# Patient Record
Sex: FEMALE | Race: WHITE
[De-identification: names, ages, dates, MRNs, and addresses within clinical notes are randomized per-mention and may not be internally consistent; named-entity substitution may affect disease eponyms.]

---

## 2018-06-16 ENCOUNTER — HOSPITAL ENCOUNTER (OUTPATIENT)
Dept: HOSPITAL 17 - NEPC | Age: 63
Setting detail: OBSERVATION
LOS: 1 days | Discharge: HOME | End: 2018-06-17
Payer: OTHER GOVERNMENT

## 2018-06-16 VITALS
HEART RATE: 118 BPM | SYSTOLIC BLOOD PRESSURE: 210 MMHG | DIASTOLIC BLOOD PRESSURE: 147 MMHG | RESPIRATION RATE: 28 BRPM | OXYGEN SATURATION: 96 %

## 2018-06-16 VITALS
RESPIRATION RATE: 22 BRPM | HEART RATE: 114 BPM | SYSTOLIC BLOOD PRESSURE: 165 MMHG | DIASTOLIC BLOOD PRESSURE: 92 MMHG | OXYGEN SATURATION: 94 %

## 2018-06-16 VITALS
DIASTOLIC BLOOD PRESSURE: 106 MMHG | RESPIRATION RATE: 30 BRPM | TEMPERATURE: 99 F | OXYGEN SATURATION: 97 % | HEART RATE: 119 BPM | SYSTOLIC BLOOD PRESSURE: 192 MMHG

## 2018-06-16 VITALS
OXYGEN SATURATION: 95 % | SYSTOLIC BLOOD PRESSURE: 166 MMHG | DIASTOLIC BLOOD PRESSURE: 76 MMHG | HEART RATE: 118 BPM | RESPIRATION RATE: 20 BRPM

## 2018-06-16 VITALS
OXYGEN SATURATION: 95 % | SYSTOLIC BLOOD PRESSURE: 190 MMHG | DIASTOLIC BLOOD PRESSURE: 84 MMHG | RESPIRATION RATE: 30 BRPM | HEART RATE: 112 BPM

## 2018-06-16 VITALS — OXYGEN SATURATION: 99 %

## 2018-06-16 VITALS
SYSTOLIC BLOOD PRESSURE: 207 MMHG | OXYGEN SATURATION: 99 % | HEART RATE: 120 BPM | RESPIRATION RATE: 28 BRPM | DIASTOLIC BLOOD PRESSURE: 93 MMHG

## 2018-06-16 VITALS — BODY MASS INDEX: 32.33 KG/M2 | HEIGHT: 69 IN | WEIGHT: 218.26 LBS

## 2018-06-16 DIAGNOSIS — R06.00: ICD-10-CM

## 2018-06-16 DIAGNOSIS — R11.0: ICD-10-CM

## 2018-06-16 DIAGNOSIS — F41.0: ICD-10-CM

## 2018-06-16 DIAGNOSIS — R07.89: Primary | ICD-10-CM

## 2018-06-16 DIAGNOSIS — Z79.84: ICD-10-CM

## 2018-06-16 DIAGNOSIS — G89.29: ICD-10-CM

## 2018-06-16 DIAGNOSIS — J44.9: ICD-10-CM

## 2018-06-16 DIAGNOSIS — E78.5: ICD-10-CM

## 2018-06-16 DIAGNOSIS — Z87.891: ICD-10-CM

## 2018-06-16 DIAGNOSIS — I10: ICD-10-CM

## 2018-06-16 DIAGNOSIS — R94.31: ICD-10-CM

## 2018-06-16 DIAGNOSIS — M54.9: ICD-10-CM

## 2018-06-16 DIAGNOSIS — E11.9: ICD-10-CM

## 2018-06-16 LAB
ALBUMIN SERPL-MCNC: 4.1 GM/DL (ref 3.4–5)
ALP SERPL-CCNC: 96 U/L (ref 45–117)
ALT SERPL-CCNC: 30 U/L (ref 10–53)
AST SERPL-CCNC: 41 U/L (ref 15–37)
BASOPHILS # BLD AUTO: 0.2 TH/MM3 (ref 0–0.2)
BASOPHILS NFR BLD: 1.4 % (ref 0–2)
BILIRUB SERPL-MCNC: 0.5 MG/DL (ref 0.2–1)
BUN SERPL-MCNC: 11 MG/DL (ref 7–18)
CALCIUM SERPL-MCNC: 8.8 MG/DL (ref 8.5–10.1)
CHLORIDE SERPL-SCNC: 96 MEQ/L (ref 98–107)
CREAT SERPL-MCNC: 1.03 MG/DL (ref 0.5–1)
EOSINOPHIL # BLD: 0.6 TH/MM3 (ref 0–0.4)
EOSINOPHIL NFR BLD: 5.2 % (ref 0–4)
ERYTHROCYTE [DISTWIDTH] IN BLOOD BY AUTOMATED COUNT: 15.3 % (ref 11.6–17.2)
GFR SERPLBLD BASED ON 1.73 SQ M-ARVRAT: 54 ML/MIN (ref 89–?)
GLUCOSE SERPL-MCNC: 144 MG/DL (ref 74–106)
HCO3 BLD-SCNC: 25.4 MEQ/L (ref 21–32)
HCT VFR BLD CALC: 36.8 % (ref 35–46)
HGB BLD-MCNC: 12.5 GM/DL (ref 11.6–15.3)
INR PPP: 1.1 RATIO
LYMPHOCYTES # BLD AUTO: 4.5 TH/MM3 (ref 1–4.8)
LYMPHOCYTES NFR BLD AUTO: 41.1 % (ref 9–44)
MAGNESIUM SERPL-MCNC: 1.7 MG/DL (ref 1.5–2.5)
MCH RBC QN AUTO: 31.2 PG (ref 27–34)
MCHC RBC AUTO-ENTMCNC: 33.9 % (ref 32–36)
MCV RBC AUTO: 91.9 FL (ref 80–100)
MONOCYTE #: 0.8 TH/MM3 (ref 0–0.9)
MONOCYTES NFR BLD: 7.7 % (ref 0–8)
NEUTROPHILS # BLD AUTO: 4.9 TH/MM3 (ref 1.8–7.7)
NEUTROPHILS NFR BLD AUTO: 44.6 % (ref 16–70)
PLATELET # BLD: 453 TH/MM3 (ref 150–450)
PMV BLD AUTO: 7.3 FL (ref 7–11)
PROT SERPL-MCNC: 8.7 GM/DL (ref 6.4–8.2)
PROTHROMBIN TIME: 10.7 SEC (ref 9.8–11.6)
RBC # BLD AUTO: 4 MIL/MM3 (ref 4–5.3)
SODIUM SERPL-SCNC: 132 MEQ/L (ref 136–145)
TROPONIN I SERPL-MCNC: (no result) NG/ML (ref 0.02–0.05)
WBC # BLD AUTO: 11 TH/MM3 (ref 4–11)

## 2018-06-16 PROCEDURE — 85730 THROMBOPLASTIN TIME PARTIAL: CPT

## 2018-06-16 PROCEDURE — 82948 REAGENT STRIP/BLOOD GLUCOSE: CPT

## 2018-06-16 PROCEDURE — 85610 PROTHROMBIN TIME: CPT

## 2018-06-16 PROCEDURE — 82550 ASSAY OF CK (CPK): CPT

## 2018-06-16 PROCEDURE — 96374 THER/PROPH/DIAG INJ IV PUSH: CPT

## 2018-06-16 PROCEDURE — 96372 THER/PROPH/DIAG INJ SC/IM: CPT

## 2018-06-16 PROCEDURE — G0378 HOSPITAL OBSERVATION PER HR: HCPCS

## 2018-06-16 PROCEDURE — 99285 EMERGENCY DEPT VISIT HI MDM: CPT

## 2018-06-16 PROCEDURE — 71045 X-RAY EXAM CHEST 1 VIEW: CPT

## 2018-06-16 PROCEDURE — 84484 ASSAY OF TROPONIN QUANT: CPT

## 2018-06-16 PROCEDURE — 96375 TX/PRO/DX INJ NEW DRUG ADDON: CPT

## 2018-06-16 PROCEDURE — 93005 ELECTROCARDIOGRAM TRACING: CPT

## 2018-06-16 PROCEDURE — 83880 ASSAY OF NATRIURETIC PEPTIDE: CPT

## 2018-06-16 PROCEDURE — 96361 HYDRATE IV INFUSION ADD-ON: CPT

## 2018-06-16 PROCEDURE — 80053 COMPREHEN METABOLIC PANEL: CPT

## 2018-06-16 PROCEDURE — 85025 COMPLETE CBC W/AUTO DIFF WBC: CPT

## 2018-06-16 PROCEDURE — 94664 DEMO&/EVAL PT USE INHALER: CPT

## 2018-06-16 PROCEDURE — 78452 HT MUSCLE IMAGE SPECT MULT: CPT

## 2018-06-16 PROCEDURE — 94640 AIRWAY INHALATION TREATMENT: CPT

## 2018-06-16 PROCEDURE — A9502 TC99M TETROFOSMIN: HCPCS

## 2018-06-16 PROCEDURE — 93017 CV STRESS TEST TRACING ONLY: CPT

## 2018-06-16 PROCEDURE — 71275 CT ANGIOGRAPHY CHEST: CPT

## 2018-06-16 PROCEDURE — 83735 ASSAY OF MAGNESIUM: CPT

## 2018-06-16 NOTE — RADRPT
EXAM DATE:  6/16/2018 6:39 PM EDT

AGE/SEX:        62 years / Female



INDICATIONS:  Short of breath.



CLINICAL DATA:  This is the patient's initial encounter. Patient reports that signs and symptoms have
 been present for 1 day and indicates a pain score of 0/10. 

                                                                          

MEDICAL/SURGICAL HISTORY:       None. None.



COMPARISON:      No prior exams available for comparison. 





FINDINGS:  

A single AP view of the chest demonstrates the lungs to be symmetrically aerated without evidence of 
mass, infiltrate or effusion. Mild basilar atelectasis. The cardiomediastinal contours are unremarkab
le.  Osseous structures are intact.  





CONCLUSION: 

Mild basilar atelectasis.



Electronically signed by: Stevenson Romero MD  6/16/2018 7:08 PM EDT

## 2018-06-16 NOTE — PD
HPI


Chief Complaint:  Respiratory Distress


Time Seen by Provider:  17:52


Travel History


International Travel<30 days:  No


Contact w/Intl Traveler<30days:  No


Traveled to known affect area:  No





History of Present Illness


HPI


The patient is a 62-year-old female who presents to the emergency department 

for shortness of breath.  The patient states she developed chest pain and 

shortness of breath earlier today while watching TV.  The chest pain is 

described as tightness, across the anterior aspect of the chest, and associated 

with shortness of breath.  The patient also notes a productive cough producing 

yellow to green sputum.  The patient does have a history of asthma and COPD 

with a history of tobacco use.  The patient denies any previous history of 

pulmonary embolism, DVT, recent hospitalizations, recent travel, or recent 

surgery.  The patient moved to the local area 6 months ago from [place].  The 

patient denies any previous intubations from her asthma or COPD.  The patient 

does complain of mild nausea but denies any vomiting, diarrhea, or abdominal 

pain.  She denies any significant edema of the lower extremities.





PFSH


Past Medical History


Asthma:  Yes


COPD:  Yes


Diabetes:  Yes


Patient Takes Glucophage:  Yes (Yesterday)


Diminished Hearing:  No


GERD:  Yes


Hypertension:  Yes


Influenza Vaccination:  Yes





Past Surgical History


Cholecystectomy:  Yes


Gynecologic Surgery:  Yes (hysterectomy)


Hysterectomy:  Yes (TOTAL)





Social History


Alcohol Use:  No


Tobacco Use:  No


Substance Use:  No





Allergies-Medications


(Allergen,Severity, Reaction):  


Coded Allergies:  


     No Known Allergies (Unverified , 6/16/18)


Reported Meds & Prescriptions





Reported Meds & Active Scripts


Active


Reported


Trazodone (Trazodone HCl) 50 Mg Tab 25 Mg PO HS


Losartan (Losartan Potassium) 100 Mg Tab 100 Mg PO DAILY


Vitamin B-12 (Cyanocobalamin) 1,000 Mcg Tab 1,000 Mcg PO DAILY


Dicyclomine (Dicyclomine HCl) 20 Mg Tab 20 Mg PO TID


Metformin (Metformin HCl) 1,000 Mg Tab 1,000 Mg PO BIDPC


Montelukast (Montelukast Sodium) 10 Mg Tab 10 Mg PO HS


Pantoprazole (Pantoprazole Sodium) 40 Mg Tab 40 Mg PO DAILY


Ranitidine (Ranitidine HCl) 300 Mg Cap 300 Mg PO DAILY


Amlodipine (Amlodipine Besylate) 10 Mg Tab 10 Mg PO DAILY


Benzonatate 100 Mg Cap 100 Mg PO TID PRN


Pravastatin 20 Mg Tab 20 Mg PO DAILY


Aspirin 81 Mg Chew 81 Mg CHEW DAILY


Duloxetine DR (Duloxetine HCl) 30 Mg Capdr 30 Mg PO DAILY


Spiriva Respimat Inh (Tiotropium Inh) 2.5 Mcg/Act Aero 2 Puff INH DAILY


     2.5 mcg = 1 inhalation


Dulera 120 Act Inh (Mometasone-Formoterol 120 Act Inh) 200-5 Mcg/Act Inh 2 Puff 

INH BID


Flexeril (Cyclobenzaprine HCl) 10 Mg Tab 10 Mg PO TID








Review of Systems


Except as stated in HPI:  all other systems reviewed are Neg


General / Constitutional:  No: Fever


HENT:  No: Lightheadedness


Cardiovascular:  Positive: Chest Pain or Discomfort


Respiratory:  Positive: Cough, Shortness of Breath, Wheezing


Gastrointestinal:  No: Nausea, Vomiting, Abdominal Pain


Musculoskeletal:  No: Edema


Neurologic:  No: Dizziness


Psychiatric:  Positive: Anxiety





Physical Exam


Narrative


GENERAL: Awake, alert, pleasant 62-year-old female who appears her stated age 

and is in mild respiratory distress


SKIN: Focused skin assessment warm/dry.


HEAD: Atraumatic. Normocephalic. 


EYES: Pupils equal and round. No scleral icterus. No injection or drainage. 


ENT: No nasal bleeding or discharge.  Mucous membranes pink and moist.


NECK: Trachea midline. No JVD. 


CARDIOVASCULAR: Regular, tachycardic with a heart rate in the 120s.


RESPIRATORY: No accessory muscle use. Clear to auscultation. Breath sounds 

equal bilaterally.  No significant wheezing noted.


GASTROINTESTINAL: Abdomen soft, non-tender, nondistended.  No rebound 

tenderness.  No guarding or rigidity.


MUSCULOSKELETAL: No obvious deformities. No clubbing.  No cyanosis.  No edema. 


NEUROLOGICAL: Awake and alert. No obvious cranial nerve deficits.  Motor 

grossly within normal limits. Normal speech.


PSYCHIATRIC: Appropriate mood and affect; insight and judgment normal.





Data


Data


Last Documented VS





Vital Signs








  Date Time  Temp Pulse Resp B/P (MAP) Pulse Ox O2 Delivery O2 Flow Rate FiO2


 


6/17/18 00:00  118 21 148/69 (95) 95 Room Air  


 


6/16/18 17:44 99.0       








Orders





 Orders


Complete Blood Count With Diff (6/16/18 18:07)


Comprehensive Metabolic Panel (6/16/18 18:07)


B-Type Natriuretic Peptide (6/16/18 18:07)


Act Partial Throm Time (Ptt) (6/16/18 18:07)


Prothrombin Time / Inr (Pt) (6/16/18 18:07)


Magnesium (Mg) (6/16/18 18:07)


Ckmb (Isoenzyme) Profile (6/16/18 18:07)


Troponin I (6/16/18 18:07)


Iv Access Insert/Monitor (6/16/18 18:07)


Ecg Monitoring (6/16/18 18:07)


Oximetry (6/16/18 18:07)


Oxygen Administration (6/16/18 18:07)


Chest, Single Ap (6/16/18 18:07)


Sodium Chloride 0.9% Flush (Ns Flush) (6/16/18 18:15)


Methylprednisolone So Succ Inj (Solumedr (6/16/18 18:15)


Albuterol-Ipratropium Neb (Duoneb Neb) (6/16/18 18:15)


Lorazepam Inj (Ativan Inj) (6/16/18 18:15)


Ct Pulmonary Angiogram (6/16/18 )


Iohexol 350 Inj (Omnipaque 350 Inj) (6/16/18 19:40)


Sodium Chlorid 0.9% 500 Ml Inj (Ns 500 M (6/16/18 21:15)


Losartan (Cozaar) (6/16/18 21:15)


Amlodipine (Norvasc) (6/16/18 21:15)


Electrocardiogram (6/16/18 16:56)


Trazodone (Desyrel) (6/16/18 23:00)


Acetaminophen (Tylenol) (6/16/18 23:00)


Cyclobenzaprine (Flexeril) (6/16/18 23:00)


Metoprolol Tartrate Inj (Lopressor Inj) (6/17/18 00:09)


Activity Bed Rest With Brp (6/17/18 00:10)


Vital Signs (Adult) Q4H (6/17/18 00:10)


Cardiac Rhythm .As Directed (6/17/18 00:10)


Notify Dr: Other .PRN (6/17/18 00:10)


Notify Dr. Parameters (6/17/18 00:10)


Resp Oxygen Nasal Cannula (6/17/18 )


Ckmb (Isoenzyme) Profile (6/17/18 00:10)


Ckmb (Isoenzyme) Profile (6/17/18 03:10)


Troponin I (6/17/18 00:10)


Troponin I (6/17/18 03:10)


Electrocardiogram (6/17/18 00:10)


Electrocardiogram (6/17/18 03:10)


^ Obtain (6/17/18 00:10)


Sodium Chloride 0.9% Flush (Ns Flush) (6/17/18 00:15)


Sodium Chloride 0.9% Flush (Ns Flush) (6/17/18 09:00)


Cardiac Monitor / Telemetry ROSAMARIA.Q8H (6/17/18 00:10)


Admit Order (Ed Use Only) (6/17/18 )





Labs





Laboratory Tests








Test


  6/16/18


18:15


 


White Blood Count 11.0 TH/MM3 


 


Red Blood Count 4.00 MIL/MM3 


 


Hemoglobin 12.5 GM/DL 


 


Hematocrit 36.8 % 


 


Mean Corpuscular Volume 91.9 FL 


 


Mean Corpuscular Hemoglobin 31.2 PG 


 


Mean Corpuscular Hemoglobin


Concent 33.9 % 


 


 


Red Cell Distribution Width 15.3 % 


 


Platelet Count 453 TH/MM3 


 


Mean Platelet Volume 7.3 FL 


 


Neutrophils (%) (Auto) 44.6 % 


 


Lymphocytes (%) (Auto) 41.1 % 


 


Monocytes (%) (Auto) 7.7 % 


 


Eosinophils (%) (Auto) 5.2 % 


 


Basophils (%) (Auto) 1.4 % 


 


Neutrophils # (Auto) 4.9 TH/MM3 


 


Lymphocytes # (Auto) 4.5 TH/MM3 


 


Monocytes # (Auto) 0.8 TH/MM3 


 


Eosinophils # (Auto) 0.6 TH/MM3 


 


Basophils # (Auto) 0.2 TH/MM3 


 


CBC Comment DIFF FINAL 


 


Differential Comment  


 


Prothrombin Time 10.7 SEC 


 


Prothromb Time International


Ratio 1.1 RATIO 


 


 


Activated Partial


Thromboplast Time 26.7 SEC 


 


 


Blood Urea Nitrogen 11 MG/DL 


 


Creatinine 1.03 MG/DL 


 


Random Glucose 144 MG/DL 


 


Total Protein 8.7 GM/DL 


 


Albumin 4.1 GM/DL 


 


Calcium Level 8.8 MG/DL 


 


Magnesium Level 1.7 MG/DL 


 


Alkaline Phosphatase 96 U/L 


 


Aspartate Amino Transf


(AST/SGOT) 41 U/L 


 


 


Alanine Aminotransferase


(ALT/SGPT) 30 U/L 


 


 


Total Bilirubin 0.5 MG/DL 


 


Sodium Level 132 MEQ/L 


 


Potassium Level 3.4 MEQ/L 


 


Chloride Level 96 MEQ/L 


 


Carbon Dioxide Level 25.4 MEQ/L 


 


Anion Gap 11 MEQ/L 


 


Estimat Glomerular Filtration


Rate 54 ML/MIN 


 


 


Total Creatine Kinase 73 U/L 


 


Troponin I


  LESS THAN 0.02


NG/ML


 


B-Type Natriuretic Peptide 3 PG/ML 











MDM


Medical Decision Making


Medical Screen Exam Complete:  Yes


Emergency Medical Condition:  Yes


Medical Record Reviewed:  Yes


Interpretation(s)


EKG reveals sinus tachycardia with a heart rate of 127.  No ischemic changes 

noted.


Differential Diagnosis


Differential diagnosis includes COPD exacerbation, asthma exacerbation, 

pulmonary embolism, acute coronary syndrome, pleural effusion, pulmonary edema, 

congestive heart failure, cardiomyopathy.


Narrative Course


IV was established, labs are drawn and sent, and the patient was placed on 

cardiac telemetry monitoring and continuous pulse oximetry monitoring.  EKG was 

ordered and interpreted.  Chest x-ray was obtained.  CT pulmonary angiogram was 

ordered as patient is tachycardic with shortness of breath, chest tightness, 

and minimal to no wheezing.  The patient was signed out to the oncoming 

physician at 7 PM with laboratory evaluation and CT pulmonary angiogram 

pending.  The patient does have abnormal vitals with increased work of breathing

, will be admitted.


Condition:  Stable











Dave Borja MD Jun 16, 2018 18:13

## 2018-06-16 NOTE — RADRPT
EXAM DATE:  6/16/2018 7:58 PM EDT

AGE/SEX:        62 years / Female



INDICATIONS:  Shortness of breath today.



CLINICAL DATA:  This is the patient's initial encounter. Patient reports that signs and symptoms have
 been present for 1 day and indicates a pain score of 0/10. 

                                                                          

MEDICAL/SURGICAL HISTORY:   Diabetes.  Hypertension.  Gastroesophageal reflux disease. Hysterectomy. 
 Cholecystectomy.



RADIATION DOSE:  10.47 CTDI (mGy)









COMPARISON:      No prior exams available for comparison. 





TECHNIQUE:  Volumetric scanning was performed using a multi-row detector CT scanner during bolus infu
emily of 70 ml Omnipaque 350 (iohexol)  nonionic water-soluble contrast as a single exam dose. The mattie
a was post processed with a variety of visualization algorithms including full volume maximum intensi
ty projection and sliding thin slab reformation.  Using automated exposure control and adjustment of 
the mA and/or kV according to patient size, radiation dose was kept as low as reasonably achievable t
o obtain optimal diagnostic quality images.



FINDINGS:  

No filling defects in the pulmonary arteries to suggest pulmonary embolic disease. Dependent atelecta
sis in the lungs. No pleural or pericardial effusion. Moderate coronary calcifications.



CONCLUSION:

1.  Negative for pulmonary embolus. Moderate coronary calcifications. No lung consolidation or effusi
on.



Electronically signed by: Stevenson Romero MD  6/16/2018 8:25 PM EDT

## 2018-06-17 VITALS
RESPIRATION RATE: 16 BRPM | TEMPERATURE: 98.9 F | OXYGEN SATURATION: 95 % | SYSTOLIC BLOOD PRESSURE: 147 MMHG | DIASTOLIC BLOOD PRESSURE: 80 MMHG | HEART RATE: 105 BPM

## 2018-06-17 VITALS
RESPIRATION RATE: 20 BRPM | SYSTOLIC BLOOD PRESSURE: 156 MMHG | OXYGEN SATURATION: 95 % | DIASTOLIC BLOOD PRESSURE: 74 MMHG | HEART RATE: 121 BPM

## 2018-06-17 VITALS
OXYGEN SATURATION: 95 % | SYSTOLIC BLOOD PRESSURE: 148 MMHG | HEART RATE: 118 BPM | RESPIRATION RATE: 21 BRPM | DIASTOLIC BLOOD PRESSURE: 69 MMHG

## 2018-06-17 VITALS
SYSTOLIC BLOOD PRESSURE: 141 MMHG | HEART RATE: 111 BPM | OXYGEN SATURATION: 93 % | RESPIRATION RATE: 18 BRPM | DIASTOLIC BLOOD PRESSURE: 81 MMHG | TEMPERATURE: 98.7 F

## 2018-06-17 VITALS — HEART RATE: 105 BPM

## 2018-06-17 VITALS
SYSTOLIC BLOOD PRESSURE: 142 MMHG | OXYGEN SATURATION: 95 % | RESPIRATION RATE: 21 BRPM | HEART RATE: 113 BPM | DIASTOLIC BLOOD PRESSURE: 69 MMHG | TEMPERATURE: 98.7 F

## 2018-06-17 VITALS
TEMPERATURE: 97.6 F | HEART RATE: 103 BPM | RESPIRATION RATE: 20 BRPM | SYSTOLIC BLOOD PRESSURE: 153 MMHG | DIASTOLIC BLOOD PRESSURE: 90 MMHG | OXYGEN SATURATION: 99 %

## 2018-06-17 VITALS — OXYGEN SATURATION: 95 %

## 2018-06-17 LAB
TROPONIN I SERPL-MCNC: (no result) NG/ML (ref 0.02–0.05)
TROPONIN I SERPL-MCNC: 0.02 NG/ML (ref 0.02–0.05)

## 2018-06-17 NOTE — EKG
Date Performed: 06/16/2018       Time Performed: 16:56:13

 

PTAGE:      62 years

 

EKG:      SINUS TACHYCARDIA ABNORMAL RHYTHM ECG

 

PREVIOUS TRACING        6/16/18 rate increase since prior tracing. Poor R-wave progression across the
 anterior precordium.

 

DOCTOR:   Teodoro Allen  Interpretating Date/Time  06/17/2018 13:27:40

## 2018-06-17 NOTE — PD
Physical Exam


Narrative


Patient signed out to me by Dr. Borja.  Please see his documentation for 

complete details.





Briefly, patient is a 62-year-old female who comes in complaining of chest pain 

and shortness of breath.  On arrival she was tachycardic, lungs were clear.  





She was given 2 DuoNeb's and a dose of Solu-Medrol with improvement of her 

breathing.  However, she continues to be tachycardic and had occasional chest 

pains.





Data


Data


Last Documented VS





Vital Signs








  Date Time  Temp Pulse Resp B/P (MAP) Pulse Ox O2 Delivery O2 Flow Rate FiO2


 


6/17/18 00:00  118 21 148/69 (95) 95 Room Air  


 


6/16/18 17:44 99.0       








Orders





 Orders


Complete Blood Count With Diff (6/16/18 18:07)


Comprehensive Metabolic Panel (6/16/18 18:07)


B-Type Natriuretic Peptide (6/16/18 18:07)


Act Partial Throm Time (Ptt) (6/16/18 18:07)


Prothrombin Time / Inr (Pt) (6/16/18 18:07)


Magnesium (Mg) (6/16/18 18:07)


Ckmb (Isoenzyme) Profile (6/16/18 18:07)


Troponin I (6/16/18 18:07)


Iv Access Insert/Monitor (6/16/18 18:07)


Ecg Monitoring (6/16/18 18:07)


Oximetry (6/16/18 18:07)


Oxygen Administration (6/16/18 18:07)


Chest, Single Ap (6/16/18 18:07)


Sodium Chloride 0.9% Flush (Ns Flush) (6/16/18 18:15)


Methylprednisolone So Succ Inj (Solumedr (6/16/18 18:15)


Albuterol-Ipratropium Neb (Duoneb Neb) (6/16/18 18:15)


Lorazepam Inj (Ativan Inj) (6/16/18 18:15)


Ct Pulmonary Angiogram (6/16/18 )


Iohexol 350 Inj (Omnipaque 350 Inj) (6/16/18 19:40)


Sodium Chlorid 0.9% 500 Ml Inj (Ns 500 M (6/16/18 21:15)


Losartan (Cozaar) (6/16/18 21:15)


Amlodipine (Norvasc) (6/16/18 21:15)


Electrocardiogram (6/16/18 16:56)


Trazodone (Desyrel) (6/16/18 23:00)


Acetaminophen (Tylenol) (6/16/18 23:00)


Cyclobenzaprine (Flexeril) (6/16/18 23:00)


Metoprolol Tartrate Inj (Lopressor Inj) (6/17/18 00:09)


Activity Bed Rest With Brp (6/17/18 00:10)


Vital Signs (Adult) Q4H (6/17/18 00:10)


Cardiac Rhythm .As Directed (6/17/18 00:10)


Notify Dr: Other .PRN (6/17/18 00:10)


Notify DrNirali Parameters (6/17/18 00:10)


Resp Oxygen Nasal Cannula (6/17/18 )


Ckmb (Isoenzyme) Profile (6/17/18 00:10)


Ckmb (Isoenzyme) Profile (6/17/18 03:10)


Troponin I (6/17/18 00:10)


Troponin I (6/17/18 03:10)


Electrocardiogram (6/17/18 00:10)


Electrocardiogram (6/17/18 03:10)


^ Obtain (6/17/18 00:10)


Sodium Chloride 0.9% Flush (Ns Flush) (6/17/18 00:15)


Sodium Chloride 0.9% Flush (Ns Flush) (6/17/18 09:00)


Cardiac Monitor / Telemetry ROSAMARIA.Q8H (6/17/18 00:10)


Admit Order (Ed Use Only) (6/17/18 )





Labs





Laboratory Tests








Test


  6/16/18


18:15


 


White Blood Count 11.0 TH/MM3 


 


Red Blood Count 4.00 MIL/MM3 


 


Hemoglobin 12.5 GM/DL 


 


Hematocrit 36.8 % 


 


Mean Corpuscular Volume 91.9 FL 


 


Mean Corpuscular Hemoglobin 31.2 PG 


 


Mean Corpuscular Hemoglobin


Concent 33.9 % 


 


 


Red Cell Distribution Width 15.3 % 


 


Platelet Count 453 TH/MM3 


 


Mean Platelet Volume 7.3 FL 


 


Neutrophils (%) (Auto) 44.6 % 


 


Lymphocytes (%) (Auto) 41.1 % 


 


Monocytes (%) (Auto) 7.7 % 


 


Eosinophils (%) (Auto) 5.2 % 


 


Basophils (%) (Auto) 1.4 % 


 


Neutrophils # (Auto) 4.9 TH/MM3 


 


Lymphocytes # (Auto) 4.5 TH/MM3 


 


Monocytes # (Auto) 0.8 TH/MM3 


 


Eosinophils # (Auto) 0.6 TH/MM3 


 


Basophils # (Auto) 0.2 TH/MM3 


 


CBC Comment DIFF FINAL 


 


Differential Comment  


 


Prothrombin Time 10.7 SEC 


 


Prothromb Time International


Ratio 1.1 RATIO 


 


 


Activated Partial


Thromboplast Time 26.7 SEC 


 


 


Blood Urea Nitrogen 11 MG/DL 


 


Creatinine 1.03 MG/DL 


 


Random Glucose 144 MG/DL 


 


Total Protein 8.7 GM/DL 


 


Albumin 4.1 GM/DL 


 


Calcium Level 8.8 MG/DL 


 


Magnesium Level 1.7 MG/DL 


 


Alkaline Phosphatase 96 U/L 


 


Aspartate Amino Transf


(AST/SGOT) 41 U/L 


 


 


Alanine Aminotransferase


(ALT/SGPT) 30 U/L 


 


 


Total Bilirubin 0.5 MG/DL 


 


Sodium Level 132 MEQ/L 


 


Potassium Level 3.4 MEQ/L 


 


Chloride Level 96 MEQ/L 


 


Carbon Dioxide Level 25.4 MEQ/L 


 


Anion Gap 11 MEQ/L 


 


Estimat Glomerular Filtration


Rate 54 ML/MIN 


 


 


Total Creatine Kinase 73 U/L 


 


Troponin I


  LESS THAN 0.02


NG/ML


 


B-Type Natriuretic Peptide 3 PG/ML 











MDM


Supervised Visit with DENNISE:  No


Narrative Course


Labs showed no acute abnormalities.  CT of the chest was negative for PE.  

Patient was given her home medications, which improved her high blood pressure.

  However she remained tachycardic, so she was given a small dose of metoprolol 

which improved her heart rate.  Should be placed in the chest pain center for 

further management of her chest pain.


Diagnosis





 Primary Impression:  


 Chest pain


 Qualified Codes:  R07.9 - Chest pain, unspecified


 Additional Impression:  


 Tachycardia





Admitting Information


Admitting Physician Requests:  Observation


Condition:  Stable











Gabrielle Almanzar MD Jun 17, 2018 02:59

## 2018-06-17 NOTE — EKG
Date Performed: 06/17/2018       Time Performed: 04:47:54

 

PTAGE:      62 years

 

EKG:      SINUS TACHYCARDIA POSSIBLE LEFT ATRIAL ENLARGEMENT SEPTAL MYOCARDIAL INFARCTION ABNORMAL EC
G

 

PREVIOUS TRACING       : 06/17/2018 02.42 Since the previous tracing, no significant change noted

 

DOCTOR:   Teodoro Allen  Interpretating Date/Time  06/17/2018 13:43:41

## 2018-06-17 NOTE — RADRPT
EXAM DATE:  6/17/2018 2:54 PM EDT

AGE/SEX:        62 years / Female



INDICATIONS:Angina. . Chest pain.   

 

CLINICAL DATA:  This is the patient's initial encounter. Patient reports that signs and symptoms have
 been present for 1 day and indicates a pain score of 0/10. 

                                                                          

MEDICAL/SURGICAL HISTORY:       Chronic obstructive pulmonary disease.  Diabetes mellitus type II.  H
ypertension. Hysterectomy.



COMPARISON:      No prior exams available for comparison. 



 

DOSE:  8.6 mCi Tc 99m Myoview at rest

              27.3  mCi Tc99m-Myoview at stress

              0.4 mg Lexiscan



STRESS SYMPTOMS: Headache.







EJECTION FRACTION:  70 %



TECHNIQUE:  The patient underwent pharmacologic stress with infusion of prescribed dose.  Continuous 
ECG tracing was monitored during stress.  Gated SPECT imaging was performed after stress and conventi
onal SPECT imaging was performed at rest.  The examination was performed on a SPECT/CT scanner, both 
attenuation and non-corrected datasets were reviewed.



FINDINGS:  

Distribution:  The maximum perfused segment at stress is in the inferoseptal wall.

Perfusion Study:   The pattern of perfusion at stress is within normal limits with regional variation
s perfusion within 25%. Pattern of perfusion at stress and rest is unchanged. The sum stress score is
 0..   

Gated Study:  There are intact wall motion and wall thickening without hypokinetic or dyskinetic segm
ents.  The ejection fraction is calculated at 70%.  



RISK CATEGORY:  Low (<1% Annual Motality Rate)



CONCLUSION: 

1.  No evidence of stress-induced ischemia.

2.  Intact wall motion with 70% ejection fraction.



Electronically signed by: Jama Arroyo MD  6/17/2018 3:22 PM EDT

## 2018-06-17 NOTE — PD.CARD.PN
Subjective


Subjective Remarks


Pleasant 62-year-old lady with a history as well recorded in the documentation.

  Records were reviewed she was discussed with the nurse practitioner she was 

seen and examined personally.  The only additional history would provide is 

that she has had panic attacks in the past and some of her current symptoms may 

be related to recurrence of this disorder.  Her underlying symptoms however 

certainly strongly suggest a respiratory etiology.


However with risk factors of diabetes hypertension hyperlipidemia further 

evaluation with nuclear stress test was felt to be appropriate.





Objective


Medications





Current Medications








 Medications


  (Trade)  Dose


 Ordered  Sig/Davy


 Route  Start Time


 Stop Time Status Last Admin


 


  (NS Flush)  2 ml  UNSCH  PRN


 IVF  6/16/18 18:15


     


 


 


  (NS Flush)  2 ml  UNSCH  PRN


 IV FLUSH  6/17/18 00:15


     


 


 


  (NS Flush)  2 ml  BID


 IV FLUSH  6/17/18 09:00


    6/17/18 07:59


 


 


  (Tylenol)  500 mg  Q4H  PRN


 PO  6/17/18 07:30


    6/17/18 08:39


 


 


  (Nitrostat Sl)  0.4 mg  Q5M  PRN


 SL  6/17/18 07:30


     


 


 


  (Aspirin)  325 mg  DAILY


 PO  6/17/18 09:00


    6/17/18 07:59


 


 


  (Albuterol Neb)  2.5 mg  Q2HR NEB  PRN


 NEB  6/17/18 07:30


     


 


 


  (Norvasc)  10 mg  DAILY


 PO  6/17/18 10:00


    6/17/18 09:40


 


 


  (Cymbalta Dr)  30 mg  DAILY


 PO  6/17/18 10:00


    6/17/18 09:40


 


 


  (Cozaar)  100 mg  DAILY


 PO  6/17/18 10:00


    6/17/18 09:40


 


 


  (Singulair)  10 mg  HS


 PO  6/17/18 21:00


     


 


 


  (Protonix)  40 mg  DAILY


 PO  6/17/18 10:00


    6/17/18 09:40


 


 


  (Pravachol)  20 mg  DAILY


 PO  6/17/18 10:00


    6/17/18 09:40


 


 


  (D50w (Vial) Inj)  50 ml  UNSCH  PRN


 IV PUSH  6/17/18 09:30


     


 


 


  (Glucagon Inj)  1 mg  UNSCH  PRN


 OTHER  6/17/18 09:30


     


 


 


  (NovoLOG


 SUPPLEMENTAL


 SCALE)  1  ACHS SLIDING  SCALE


 SQ  6/17/18 12:00


     


 


 


  (Symbicort


 160-4.5 Mcg Inh)  2 puff  BID


 INH  6/17/18 21:00


     


 








Vital Signs / I&O





Vital Signs








  Date Time  Temp Pulse Resp B/P (MAP) Pulse Ox O2 Delivery O2 Flow Rate FiO2


 


6/17/18 12:00 97.6 103 20 153/90 (111) 99   


 


6/17/18 09:40   12     


 


6/17/18 08:00 98.7 113 21 142/69 (93) 95   


 


6/17/18 07:32     95   21


 


6/17/18 06:13        21


 


6/17/18 04:44 98.7 111 18 141/81 (101) 93   


 


6/17/18 03:13  105      


 


6/17/18 02:54        


 


6/17/18 02:36 98.9 105 16 147/80 (102) 95   


 


6/17/18 00:49  121 20 156/74 (101) 95 Room Air  


 


6/17/18 00:00  118 21 148/69 (95) 95 Room Air  


 


6/16/18 23:00  118 20 166/76 (106) 95 Room Air  


 


6/16/18 22:00  114 22 165/92 (116) 94 Room Air  


 


6/16/18 21:00  118 28 210/147 (168) 96 Room Air  


 


6/16/18 20:00  112 30 190/84 (119) 95 Room Air  


 


6/16/18 18:15     99 Room Air  


 


6/16/18 18:15     100 Room Air  


 


6/16/18 17:59     100 Room Air  


 


6/16/18 17:59  120 28 207/93 (131) 99 Room Air  


 


6/16/18 17:44 99.0 119 30 192/106 (134) 97   














I/O      


 


 6/16/18 6/16/18 6/16/18 6/17/18 6/17/18 6/17/18





 06:59 14:59 22:59 06:59 14:59 22:59


 


Intake Total    500 ml  


 


Balance    500 ml  


 


      


 


Intake IV Total    500 ml  


 


# Voids     2 








Physical Exam


Well-nourished well-developed black lady somewhat obese but in no distress


Neck supple no JVD masses nodes or bruits


Chest mildly diminished breath sounds but no rales wheezes or rhonchi


Cardiovascular regular sinus rhythm (slightly tachycardic as she was examined 

in nuclear med and was feeling apprehensive about the scanner) but no gallop 

rub or murmur


Extremities no clubbing cyanosis or edema


Laboratory





Laboratory Tests








Test


  6/16/18


18:15 6/17/18


01:15 6/17/18


03:10


 


White Blood Count 11.0 TH/MM3   


 


Red Blood Count 4.00 MIL/MM3   


 


Hemoglobin 12.5 GM/DL   


 


Hematocrit 36.8 %   


 


Mean Corpuscular Volume 91.9 FL   


 


Mean Corpuscular Hemoglobin 31.2 PG   


 


Mean Corpuscular Hemoglobin


Concent 33.9 % 


  


  


 


 


Red Cell Distribution Width 15.3 %   


 


Platelet Count 453 TH/MM3   


 


Mean Platelet Volume 7.3 FL   


 


Neutrophils (%) (Auto) 44.6 %   


 


Lymphocytes (%) (Auto) 41.1 %   


 


Monocytes (%) (Auto) 7.7 %   


 


Eosinophils (%) (Auto) 5.2 %   


 


Basophils (%) (Auto) 1.4 %   


 


Neutrophils # (Auto) 4.9 TH/MM3   


 


Lymphocytes # (Auto) 4.5 TH/MM3   


 


Monocytes # (Auto) 0.8 TH/MM3   


 


Eosinophils # (Auto) 0.6 TH/MM3   


 


Basophils # (Auto) 0.2 TH/MM3   


 


CBC Comment DIFF FINAL   


 


Differential Comment    


 


Prothrombin Time 10.7 SEC   


 


Prothromb Time International


Ratio 1.1 RATIO 


  


  


 


 


Activated Partial


Thromboplast Time 26.7 SEC 


  


  


 


 


Blood Urea Nitrogen 11 MG/DL   


 


Creatinine 1.03 MG/DL   


 


Random Glucose 144 MG/DL   


 


Total Protein 8.7 GM/DL   


 


Albumin 4.1 GM/DL   


 


Calcium Level 8.8 MG/DL   


 


Magnesium Level 1.7 MG/DL   


 


Alkaline Phosphatase 96 U/L   


 


Aspartate Amino Transf


(AST/SGOT) 41 U/L 


  


  


 


 


Alanine Aminotransferase


(ALT/SGPT) 30 U/L 


  


  


 


 


Total Bilirubin 0.5 MG/DL   


 


Sodium Level 132 MEQ/L   


 


Potassium Level 3.4 MEQ/L   


 


Chloride Level 96 MEQ/L   


 


Carbon Dioxide Level 25.4 MEQ/L   


 


Anion Gap 11 MEQ/L   


 


Estimat Glomerular Filtration


Rate 54 ML/MIN 


  


  


 


 


Total Creatine Kinase 73 U/L  60 U/L  70 U/L 


 


Troponin I


  LESS THAN 0.02


NG/ML 0.02 NG/ML 


  LESS THAN 0.02


NG/ML


 


B-Type Natriuretic Peptide 3 PG/ML   








Imaging





Last 24 hours Impressions








Chest X-Ray 6/16/18 8798 Signed





Impressions: 





 CONCLUSION: 





 Mild basilar atelectasis.





  





 











Assessment and Plan


Assessment and Plan


Patient has ruled out for ACS and is currently undergoing scanning.  If 

negative for ischemia she will be continued on treatment for her underlying 

pulmonary disease and is directed to discuss further evaluation and treatment 

of her panic attacks with her primary care physician.


Code Status


Full code


Discussed Condition With


Discussed with nurse practitioner and patient











Teodoro Allen MD Jun 17, 2018 13:38

## 2018-06-17 NOTE — HHI.DCPOC
Discharge Care Plan


Diagnosis:  


(1) Atypical chest pain


Goals to Promote Your Health


* To prevent worsening of your condition and complications


* To maintain your health at the optimal level


Directions to Meet Your Goals


*** Take your medications as prescribed


*** Follow your dietary instruction


*** Follow activity as directed








*** Keep your appointments as scheduled


*** Take your immunizations and boosters as scheduled


*** If your symptoms worsen call your PCP, if no PCP go to Urgent Care Center 

or Emergency Room***


*** Smoking is Dangerous to Your Health. Avoid second hand smoke***


***Call the 24-hour hour crisis hotline for domestic abuse at 1-419.532.9516***











Na Vázquez Jun 17, 2018 15:29

## 2018-06-17 NOTE — EKG
Date Performed: 06/17/2018       Time Performed: 02:42:12

 

PTAGE:      62 years

 

EKG:      SINUS TACHYCARDIA POSSIBLE LEFT ATRIAL ENLARGEMENT SEPTAL MYOCARDIAL INFARCTION ABNORMAL EC
G

 

PREVIOUS TRACING       : 06/16/2018 16.56 Since the previous tracing, no significant change noted

 

DOCTOR:   Teodoro Allen  Interpretating Date/Time  06/19/2018 06:42:57

## 2018-06-17 NOTE — HHI.HP
HPI


Primary Care Physician


[facility]


Chief Complaint


Chest tightness


History of Present Illness


62-year-old female with history of type 2 diabetes, hypertension, hyperlipidemia

, COPD, and former smoker presents emergency room for further evaluation of 

chest tightness.  Onset yesterday afternoon.  Location substernal.  

Characterized as tightness, "not like my asthma or COPD." No wheezing or recent 

upper respiratory infection.  No radiation.  Moderate in severity.  Duration at 

least 6 hours.  Associated symptoms included dyspnea, hurt to deep breath, 

nausea, and diaphoresis.  No vomiting.  No known precipitating factors.  

Relieving factors included respiratory treatment provided in ER, resolving 

symptoms quickly.  Denies similar pain in the past. No increase use of PRN 

nebulizers treatments required recently. Increase seasonal allergy symptoms.





Review of Systems


General: No fatigue, weakness, fever, chills, recent illness, or change in 

appetite. Has been in her general state of health. 


HEENT: No HA, no vision changes, no nasal congestion or drainage, no dysphasia


CV: As stated above. No current chest discomfort or pressure.


RESP: No SOB, cough, or wheeze. Stable, unchanged COPD, without increase in 

symptoms.


GI: Chronic diarrhea, follows with her PCP and GI closely. No nausea, vomiting, 

pain, distention, melena, or blood in the stool.  


: No dysuria, urgency, frequency


EXT: No lower leg edema, no paraesthesias


MS: No discomfort or change in ROM


NEURO: No dizziness, difficulty with balance, LOC, motor/sensory deficits


PSYCH: No anxiety, depression, or suicidal ideation


SKIN: No rashes, no concerning lesions





Past Family Social History


Allergies:  


Coded Allergies:  


     No Known Allergies (Unverified , 18)


Past Medical History


Type 2 diabetes mellitus, hypertension, hyperlipidemia, COPD, asthma, former 

smoker, chronic back pain


Past Surgical History


Hysterectomy, cholecystectomy, bladder suspension


Reported Medications





Reported Meds & Active Scripts


Active


Reported


Trazodone (Trazodone HCl) 50 Mg Tab 25 Mg PO HS


Losartan (Losartan Potassium) 100 Mg Tab 100 Mg PO DAILY


Vitamin B-12 (Cyanocobalamin) 1,000 Mcg Tab 1,000 Mcg PO DAILY


Dicyclomine (Dicyclomine HCl) 20 Mg Tab 20 Mg PO TID


Metformin (Metformin HCl) 1,000 Mg Tab 1,000 Mg PO BIDPC


Montelukast (Montelukast Sodium) 10 Mg Tab 10 Mg PO HS


Pantoprazole (Pantoprazole Sodium) 40 Mg Tab 40 Mg PO DAILY


Ranitidine (Ranitidine HCl) 300 Mg Cap 300 Mg PO DAILY


Amlodipine (Amlodipine Besylate) 10 Mg Tab 10 Mg PO DAILY


Benzonatate 100 Mg Cap 100 Mg PO TID PRN


Pravastatin 20 Mg Tab 20 Mg PO DAILY


Aspirin 81 Mg Chew 81 Mg CHEW DAILY


Duloxetine DR (Duloxetine HCl) 30 Mg Capdr 30 Mg PO DAILY


Spiriva Respimat Inh (Tiotropium Inh) 2.5 Mcg/Act Aero 2 Puff INH DAILY


     2.5 mcg = 1 inhalation


Dulera 120 Act Inh (Mometasone-Formoterol 120 Act Inh) 200-5 Mcg/Act Inh 2 Puff 

INH BID


Flexeril (Cyclobenzaprine HCl) 10 Mg Tab 10 Mg PO TID


Active Ordered Medications





Current Medications








 Medications


  (Trade)  Dose


 Ordered  Sig/Davy


 Route  Start Time


 Stop Time Status Last Admin


 


  (NS Flush)  2 ml  UNSCH  PRN


 IVF  18 18:15


     


 


 


  (NS Flush)  2 ml  UNSCH  PRN


 IV FLUSH  18 00:15


     


 


 


  (NS Flush)  2 ml  BID


 IV FLUSH  18 09:00


    18 07:59


 


 


  (Tylenol)  500 mg  Q4H  PRN


 PO  18 07:30


     


 


 


  (Nitrostat Sl)  0.4 mg  Q5M  PRN


 SL  18 07:30


     


 


 


  (Aspirin)  325 mg  DAILY


 PO  18 09:00


    18 07:59


 


 


  (Albuterol Neb)  2.5 mg  Q2HR NEB  PRN


 NEB  18 07:30


     


 








Family History


Sister CABGx3 in early 50s.


Social History


No known coronary artery disease. Known Type 2 diabetes, hypertension, and 

hyperlipidemia.


Former smoker 35-pack-year history.  Denies any alcohol or illegal drug use.


Endorses a sedentary lifestyle. S[employer] years.





Past cardiac testing


None





Physical Exam


Vital Signs





Vital Signs








  Date Time  Temp Pulse Resp B/P (MAP) Pulse Ox O2 Delivery O2 Flow Rate FiO2


 


18 07:32     95   21


 


18 06:13        21


 


18 04:44 98.7 111 18 141/81 (101) 93   


 


6/17/18 03:13  105      


 


18 02:54        


 


18 02:36 98.9 105 16 147/80 (102) 95   


 


18 00:49  121 20 156/74 (101) 95 Room Air  


 


18 00:00  118 21 148/69 (95) 95 Room Air  


 


18 23:00  118 20 166/76 (106) 95 Room Air  


 


18 22:00  114 22 165/92 (116) 94 Room Air  


 


18 21:00  118 28 210/147 (168) 96 Room Air  


 


18 20:00  112 30 190/84 (119) 95 Room Air  


 


18 18:15     99 Room Air  


 


18 18:15     100 Room Air  


 


18 17:59     100 Room Air  


 


18 17:59  120 28 207/93 (131) 99 Room Air  


 


18 17:44 99.0 119 30 192/106 (134) 97   








Physical Exam


GENERAL: Alert WN, WD, NAD, pleasant, moderately obese, -American female


HEAD: NC, AT


EYES: Sclera clear, conjunctiva without injection, pupils equal and round


ENT: Mucous membranes pink and moist, no nasal discharge or bleeding, poor 

dentition


CV: RRR, without murmur, rub, gallop, no JVD, S1-S2 no S3-S4.  Chest wall pain 

reproduced with palpation. 


RESP: Clear lungs throughout bilateral, no crackles, wheeze, rhonchi, 

symmetrical chest rise, nonlabored, able to speak in full sentences


ABD: Soft, NT, ND, no masses, positive bowel tones, obese, round


EXT: Pulses +2x4, no dependent edema


MS: Normal tone x4 extremities, no obvious deformities, full range of motion


NEURO: CN II through CN XII grossly intact, motor strength 5/5


PSYCH: A+O x3, pleasant affect, appropriate speech, mood, insight and judgment


SKIN: Normal turgor, normal texture, no lesions, no rashes, brisk cap refill


Laboratory





Laboratory Tests








Test


  18


18:15 18


01:15 18


03:10


 


White Blood Count 11.0   


 


Red Blood Count 4.00   


 


Hemoglobin 12.5   


 


Hematocrit 36.8   


 


Mean Corpuscular Volume 91.9   


 


Mean Corpuscular Hemoglobin 31.2   


 


Mean Corpuscular Hemoglobin


Concent 33.9 


  


  


 


 


Red Cell Distribution Width 15.3   


 


Platelet Count 453   


 


Mean Platelet Volume 7.3   


 


Neutrophils (%) (Auto) 44.6   


 


Lymphocytes (%) (Auto) 41.1   


 


Monocytes (%) (Auto) 7.7   


 


Eosinophils (%) (Auto) 5.2   


 


Basophils (%) (Auto) 1.4   


 


Neutrophils # (Auto) 4.9   


 


Lymphocytes # (Auto) 4.5   


 


Monocytes # (Auto) 0.8   


 


Eosinophils # (Auto) 0.6   


 


Basophils # (Auto) 0.2   


 


CBC Comment DIFF FINAL   


 


Differential Comment    


 


Prothrombin Time 10.7   


 


Prothromb Time International


Ratio 1.1 


  


  


 


 


Activated Partial


Thromboplast Time 26.7 


  


  


 


 


Blood Urea Nitrogen 11   


 


Creatinine 1.03   


 


Random Glucose 144   


 


Total Protein 8.7   


 


Albumin 4.1   


 


Calcium Level 8.8   


 


Magnesium Level 1.7   


 


Alkaline Phosphatase 96   


 


Aspartate Amino Transf


(AST/SGOT) 41 


  


  


 


 


Alanine Aminotransferase


(ALT/SGPT) 30 


  


  


 


 


Total Bilirubin 0.5   


 


Sodium Level 132   


 


Potassium Level 3.4   


 


Chloride Level 96   


 


Carbon Dioxide Level 25.4   


 


Anion Gap 11   


 


Estimat Glomerular Filtration


Rate 54 


  


  


 


 


Total Creatine Kinase 73  60  70 


 


Troponin I LESS THAN 0.02  0.02  LESS THAN 0.02 


 


B-Type Natriuretic Peptide 3   








Result Diagram:  


18 1815                                                                   

             18 1815





Imaging





Last 48 hours Impressions








Chest X-Ray 18 1807 Signed





Impressions: 





 CONCLUSION: 





 Mild basilar atelectasis.





  





 


 


CT Angiography 18 0000 Signed





Impressions: 





 CONCLUSION:





 1.  Negative for pulmonary embolus. Moderate coronary calcifications. No lung c





 onsolidation or effusion.





  





 








Course


EKG


NST, no st t segment changes





Caprini VTE Risk Assessment


Caprini VTE Risk Assessment:  Mod/High Risk (score >= 2)


Caprini Risk Assessment Model











 Point Value = 1          Point Value = 2  Point Value = 3  Point Value = 5


 


Age 41-60


Minor surgery


BMI > 25 kg/m2


Swollen legs


Varicose veins


Pregnancy or postpartum


History of unexplained or recurrent


   spontaneous 


Oral contraceptives or hormone


   replacement


Sepsis (< 1 month)


Serious lung disease, including


   pneumonia (< 1 month)


Abnormal pulmonary function


Acute myocardial infarction


Congestive heart failure (< 1 month)


History of inflammatory bowel disease


Medical patient at bed rest Age 61-74


Arthroscopic surgery


Major open surgery (> 45 min)


Laparoscopic surgery (> 45 min)


Malignancy


Confined to bed (> 72 hours)


Immobilizing plaster cast


Central venous access Age >= 75


History of VTE


Family history of VTE


Factor V Leiden


Prothrombin 61909L


Lupus anticoagulant


Anticardiolipin antibodies


Elevated serum homocysteine


Heparin-induced thrombocytopenia


Other congenital or acquired


   thrombophilia Stroke (< 1 month)


Elective arthroplasty


Hip, pelvis, or leg fracture


Acute spinal cord injury (< 1 month)








Prophylaxis Regimen











   Total Risk


Factor Score Risk Level Prophylaxis Regimen


 


0-1      Low Early ambulation


 


2 Moderate Order ONE of the following:


*Sequential Compression Device (SCD)


*Heparin 5000 units SQ BID


 


3-4 Higher Order ONE of the following medications:


*Heparin 5000 units SQ TID


*Enoxaparin/Lovenox 40 mg SQ daily (WT < 150 kg, CrCl > 30 mL/min)


*Enoxaparin/Lovenox 30 mg SQ daily (WT < 150 kg, CrCl > 10-29 mL/min)


*Enoxaparin/Lovenox 30 mg SQ BID (WT < 150 kg, CrCl > 30 mL/min)


AND/OR


*Sequential Compression Device (SCD)


 


5 or more Highest Order ONE of the following medications:


*Heparin 5000 units SQ TID (Preferred with Epidurals)


*Enoxaparin/Lovenox 40 mg SQ daily (WT < 150 kg, CrCl > 30 mL/min)


*Enoxaparin/Lovenox 30 mg SQ daily (WT < 150 kg, CrCl > 10-29 mL/min)


*Enoxaparin/Lovenox 30 mg SQ BID (WT < 150 kg, CrCl > 30 mL/min)


AND


*Sequential Compression Device (SCD)











Assessment and Plan


Assessment and Plan


#1 Atypical chest pain-admitted chest pain center.  ACS ruled out with 3 sets 

of EKGs, cardiac enzymes, and monitor on telemetry overnight.  Will be seen 

evaluated by Dr. Teodoro Allen.  Discussed due to risk factors likely will 

proceed with chemical stress test later this afternoon, unable to walk on 

treadmill due to chronic back pain.  Agreeable to plan of care. If testing 

unremarkable, plan would be to discharge home and follow up with her PCP.


#2 History of hypertension-continue losartan and amlodipine, consider adding 

beta blocker


#3 History of hyperlipidemia-continue pravastatin


#4 History of type II diabetes-hold oral anti-glycemic's while n.p.o., SSI low 

dose coverage


#5 History of COPD-continue Spiriva, albuterol every 2 hours as needed 

nebulizers for dyspnea or wheezing.











Na Vázquez [facility] 2018 08:32

## 2018-06-18 NOTE — EKG
Date Performed: 06/17/2018       Time Performed: 04:37:25

 

PTAGE:      62 years

 

EKG:      Sinus rhythm 

 

 LOW QRS VOLTAGE IN PRECORDIAL LEADS BORDERLINE ECG INTERPRETATION BASED ON A DEFAULT AGE OF 40 YEARS
 

 

NO PREVIOUS TRACING            

 

DOCTOR:   Matthew Chan  Interpretating Date/Time  06/18/2018 15:30:20

## 2018-06-18 NOTE — TR
Date Performed: 06/17/2018       Time Performed: 13:30:23

 

DOCTOR:      Matthew Chan 

 

DRUG LIST:     

CLINICAL HISTORY:     

REASON FOR TEST:     

REASON FOR ENDING:     

OBSERVATION:     

CONCLUSION:     

COMMENTS:      Lexiscan stress test was performed under standard four minute protocol.  Radionuclide 
was injected one minute prior to ending the test. No electrocardiographic abormalities were present t
o suggest ischemia. Nuclear imaging and interpretation are pending.